# Patient Record
Sex: FEMALE | Race: ASIAN
[De-identification: names, ages, dates, MRNs, and addresses within clinical notes are randomized per-mention and may not be internally consistent; named-entity substitution may affect disease eponyms.]

---

## 2022-03-16 PROBLEM — Z00.00 ENCOUNTER FOR PREVENTIVE HEALTH EXAMINATION: Status: ACTIVE | Noted: 2022-03-16

## 2022-03-18 ENCOUNTER — NON-APPOINTMENT (OUTPATIENT)
Age: 72
End: 2022-03-18

## 2022-03-23 ENCOUNTER — APPOINTMENT (OUTPATIENT)
Dept: ORTHOPEDIC SURGERY | Facility: CLINIC | Age: 72
End: 2022-03-23
Payer: MEDICARE

## 2022-03-23 ENCOUNTER — NON-APPOINTMENT (OUTPATIENT)
Age: 72
End: 2022-03-23

## 2022-03-23 DIAGNOSIS — S99.922A UNSPECIFIED INJURY OF LEFT FOOT, INITIAL ENCOUNTER: ICD-10-CM

## 2022-03-23 DIAGNOSIS — S92.325A NONDISPLACED FRACTURE OF SECOND METATARSAL BONE, LEFT FOOT, INITIAL ENCOUNTER FOR CLOSED FRACTURE: ICD-10-CM

## 2022-03-23 DIAGNOSIS — S92.335A NONDISPLACED FRACTURE OF THIRD METATARSAL BONE, LEFT FOOT, INITIAL ENCOUNTER FOR CLOSED FRACTURE: ICD-10-CM

## 2022-03-23 PROCEDURE — 99204 OFFICE O/P NEW MOD 45 MIN: CPT

## 2022-03-23 PROCEDURE — 28470 CLTX METATARSAL FX WO MNP EA: CPT | Mod: T2,T1

## 2022-03-23 PROCEDURE — 73630 X-RAY EXAM OF FOOT: CPT | Mod: LT

## 2022-03-23 NOTE — HISTORY OF PRESENT ILLNESS
[FreeTextEntry1] : The patient is a 71 year old female presenting for an initial evaluation of left foot injury sustained on 2/16/2022. The patient reports that she fainted due to double dosing on her blood pressure medication, resulting in a fall. She then obtained x-rays confirming nondisplaced fractures to her second and third metatarsals. She was treated via CAM boot immobilization for the past 1 month. She does not currently take Vitamin D supplementation. She presents today to obtain new x-rays to assess healing of the fractures. Pain scale 3/10. She has been completing a HEP for this issue. She denies any significant pain in the office today. Denies any numbness or tingling sensations. The patient presents wearing a CAM boot. No other complaints.

## 2022-03-23 NOTE — PHYSICAL EXAM
[de-identified] : General: Alert and oriented x3. In no acute distress. Pleasant in nature with a normal affect. No apparent respiratory distress.\par \par Left Foot Exam\par Skin: Clean, dry, intact\par Inspection: No obvious malalignment, no masses, no swelling, no effusion\par Pulses: 2+ DP/PT pulses\par ROM: FOOT Full  ROM of digits, ANKLE 10 degrees of dorsiflexion, 40 degrees of plantarflexion, 10 degrees of subtalar motion.\par Painful ROM: None\par Tenderness: No pain over the second and third metatarsals. No tenderness over the medial malleolus, No tenderness over the lateral malleolus, no CFL/ATFL/PTFL pain, no deltoid ligament pain. No heel pain. No Achilles tenderness. No 5th metatarsal pain. No pain to the LisFranc joint. No ttp over the posterior tibial tendon.\par Stability: Negative anterior/posterior drawer.\par Strength: 5/5 ADD/ABD/TA/GS/EHL/FHL/EDL\par Neuro: Sensation in tact to light touch throughout\par Additional tests: Negative Mortons test, negative tarsal tunnel tinels, negative single heel rise.	 [de-identified] : 3V of the left foot were ordered, obtained, and reviewed by me today, 03/23/2022, revealed: Nondisplaced second and third metatarsal fractures. \par

## 2022-03-23 NOTE — DISCUSSION/SUMMARY
[de-identified] : Today I had a lengthy discussion with the patient regarding their left foot injury. I have addressed all the patient's concerns surrounding the pathology of their condition. XR imaging was completed in office today and results were reviewed with the patient. I recommended that the patient begin to transition out of the CAM boot to a stiff sneaker over the course of the next 2-3 weeks as tolerated. I recommend that the patient perform a home exercise program for the lower extremities. I recommend that the patient utilize 2,000 units of vitamin D supplementation. The patient understood and verbally agreed to the treatment plan. All of their questions were answered and they were satisfied with the visit. The patient should call the office if they have any questions or experience worsening symptoms. I would like to see the patient back in the office in 4-6 weeks PRN to reassess their condition. 				\par

## 2022-03-23 NOTE — ADDENDUM
[FreeTextEntry1] : I, Isabell Ochoa, acted solely as a scribe for Dr. Wallace Zafar on this date 03/23/2022.\par \par All medical record entries made by the Scribe were at my, Dr. Wallace Zafar, direction and personally dictated by me on 03/23/2022 . I have reviewed the chart and agree that the record accurately reflects my personal performance of the history, physical exam, assessment and plan. I have also personally directed, reviewed, and agreed with the chart.	\par

## 2022-12-30 NOTE — ASU PATIENT PROFILE, ADULT - FALL HARM RISK - UNIVERSAL INTERVENTIONS
Bed in lowest position, wheels locked, appropriate side rails in place/Call bell, personal items and telephone in reach/Instruct patient to call for assistance before getting out of bed or chair/Non-slip footwear when patient is out of bed/[place] to call system/Physically safe environment - no spills, clutter or unnecessary equipment/Purposeful Proactive Rounding/Room/bathroom lighting operational, light cord in reach

## 2022-12-30 NOTE — ASU PATIENT PROFILE, ADULT - MEDICATIONS TO HOLD
pt will wait to take farxiga until after procedure instructed pt will wait to take farxiga until after procedure instructed to hold xanax morning of procedure (she only takes as needed),  pt will wait to take farxiga until after procedure

## 2022-12-30 NOTE — H&P ADULT - NSICDXPASTMEDICALHX_GEN_ALL_CORE_FT
PAST MEDICAL HISTORY:  History of cardiomyopathy     HLD (hyperlipidemia)      PAST MEDICAL HISTORY:  History of cardiomyopathy     History of hepatitis B     HLD (hyperlipidemia)

## 2022-12-30 NOTE — H&P ADULT - NSICDXFAMILYHX_GEN_ALL_CORE_FT
FAMILY HISTORY:  Mother  Still living? Unknown  FH: lymphoma, Age at diagnosis: Age Unknown    Grandparent  Still living? No  Family history of heart attack, Age at diagnosis: Age Unknown

## 2022-12-30 NOTE — ASU PATIENT PROFILE, ADULT - VISION (WITH CORRECTIVE LENSES IF THE PATIENT USUALLY WEARS THEM):
reading glasses with patient/Partially impaired: cannot see medication labels or newsprint, but can see obstacles in path, and the surrounding layout; can count fingers at arm's length

## 2022-12-30 NOTE — H&P ADULT - NSHPLABSRESULTS_GEN_ALL_CORE
PET myocardial Perfusion imging (12/8/22): EF45% at stress, 39% at rest, mild global LV hypokinesis, large defect, fixed perfusion abnormality in apical anteiror, mid anterior and apical lateral segment, c/w infarction of LAD     EKG (12/8/22): SR at 73 bpm, EKG (12/8/22): SR at 73 bpm,    PET myocardial Perfusion imging (12/8/22): EF45% at stress, 39% at rest, mild global LV hypokinesis, large defect, fixed perfusion abnormality in apical anteiror, mid anterior and apical lateral segment, c/w infarction of LAD

## 2022-12-30 NOTE — H&P ADULT - HISTORY OF PRESENT ILLNESS
71. y.o female with PMHx of HLD, idiopathic cardiomyopathy presented to cardiology office for routine follow up, Pt underwent CTA coronaries, which revealed LAD infarction. Pt referred for cardiac cath for further ischemic evaluation.   COVID-19 PCR (12/31/22):     ASA class:  Cr:  GFR;  Bleedingrisk:  Juan Carlos score:  71. y.o female with PMHx of HLD, idiopathic cardiomyopathy presented to cardiology office with c/o increased fatigue. Pt underwent CTA coronaries, which revealed LAD infarction. Pt referred for cardiac cath for further ischemic evaluation.   COVID-19 PCR (12/31/22):

## 2022-12-30 NOTE — H&P ADULT - ASSESSMENT
71. y.o female with PMHx of HLD, idiopathic cardiomyopathy presented to cardiology office for routine follow up, Pt underwent CTA coronaries, which revealed LAD infarction. Pt referred for cardiac cath for further ischemic evaluation.  - ROLANDO protocol pre hydration: NS 250cc IV bolus x1   - Procedure, its risks, alternatives, benefits and potential complications were discussed in detail. Risks include but not limited to bleeding, infection, allergy, renal failure requiring dialysis, stroke, vascular injury, pericardial tamponade, arrhythmias, MI and even death. Pt is agreeable and has consented for the procedure.     71. y.o female with PMHx of HLD, idiopathic cardiomyopathy presented to cardiology office with c/o increasing fatigue, Pt underwent CTA coronaries, which revealed LAD infarction. Pt referred for cardiac cath for further ischemic evaluation.  ASA class: II  Creatinine:  0.66  GFR: 93  Bleeding  Risk score:  1.9%  Juan Carlos Score:  6 pt ( no prehydration due to decreased EF)

## 2022-12-30 NOTE — H&P ADULT - PROBLEM SELECTOR PLAN 1
with associated increased fatigue  No prehydration 2/2 decrease in EF (39%)  -Consent obtained for cardiac catheterization w/ coronary angiogram, possible sedation and possible stent placement. Pt is competent, has capacity, and understands risks and benefits of procedure. Risks and benefits discussed. Risk discussed included, but not limited to MI, stroke, mortality, major bleeding, arrythmia, or infection. All questions answered

## 2023-01-03 ENCOUNTER — OUTPATIENT (OUTPATIENT)
Dept: OUTPATIENT SERVICES | Facility: HOSPITAL | Age: 73
LOS: 1 days | Discharge: HOME | End: 2023-01-03
Payer: MEDICARE

## 2023-01-03 VITALS
TEMPERATURE: 97 F | HEIGHT: 60 IN | OXYGEN SATURATION: 98 % | SYSTOLIC BLOOD PRESSURE: 117 MMHG | HEART RATE: 76 BPM | DIASTOLIC BLOOD PRESSURE: 63 MMHG | RESPIRATION RATE: 16 BRPM | WEIGHT: 125 LBS

## 2023-01-03 VITALS
RESPIRATION RATE: 16 BRPM | DIASTOLIC BLOOD PRESSURE: 49 MMHG | HEART RATE: 82 BPM | OXYGEN SATURATION: 97 % | SYSTOLIC BLOOD PRESSURE: 91 MMHG

## 2023-01-03 DIAGNOSIS — I25.10 ATHEROSCLEROTIC HEART DISEASE OF NATIVE CORONARY ARTERY WITHOUT ANGINA PECTORIS: ICD-10-CM

## 2023-01-03 DIAGNOSIS — R53.83 OTHER FATIGUE: ICD-10-CM

## 2023-01-03 DIAGNOSIS — R93.1 ABNORMAL FINDINGS ON DIAGNOSTIC IMAGING OF HEART AND CORONARY CIRCULATION: ICD-10-CM

## 2023-01-03 DIAGNOSIS — R94.39 ABNORMAL RESULT OF OTHER CARDIOVASCULAR FUNCTION STUDY: ICD-10-CM

## 2023-01-03 PROCEDURE — 93458 L HRT ARTERY/VENTRICLE ANGIO: CPT

## 2023-01-03 PROCEDURE — 93458 L HRT ARTERY/VENTRICLE ANGIO: CPT | Mod: 26

## 2023-01-03 PROCEDURE — C1894: CPT

## 2023-01-03 PROCEDURE — C1887: CPT

## 2023-01-03 PROCEDURE — C1769: CPT

## 2023-01-03 RX ORDER — ICOSAPENT ETHYL 500 MG/1
2 CAPSULE, LIQUID FILLED ORAL
Qty: 0 | Refills: 0 | DISCHARGE

## 2023-01-03 NOTE — PROGRESS NOTE ADULT - SUBJECTIVE AND OBJECTIVE BOX
Nurse Practitioner Progress note:     HPI:  71. y.o female with PMHx of HLD, idiopathic cardiomyopathy presented to cardiology office with c/o increased fatigue. Pt underwent CTA coronaries, which revealed LAD infarction. Pt referred for cardiac cath for further ischemic evaluation.   COVID-19 PCR (12/31/22):    (30 Dec 2022 15:31)          Vital Signs  T(C): 36.3 (01-03-23 @ 09:14), Max: 36.3 (01-03-23 @ 09:14)  HR: 80 (01-03-23 @ 12:30) (71 - 84)  BP: 109/67 (01-03-23 @ 12:30) (107/64 - 117/63)  RR: 16 (01-03-23 @ 12:30) (16 - 16)  SpO2: 96% (01-03-23 @ 12:30) (95% - 98%)  Wt(kg): --        PHYSICAL EXAM:  NEURO: Non-focal, AxOx3.  No neuro deficits   CHEST/LUNG: Clear to auscultation bilaterally; No wheeze  HEART: s1 s2 Regular rate and rhythm; No murmurs, rubs, or gallops  ABDOMEN: Soft, Nontender, Nondistended; Bowel sounds present X 4 quadrants   EXTREMITIES:  2+ Peripheral Pulses, No clubbing, cyanosis, or edema   VASCULAR: Peripheral pulses palpable 2+ bilaterally  PROCEDURE SITE: RRA accessed, hemoband to be removed 1 hour post placement.  Site is without hematoma or bleeding. Sensation and OLGA intact. Distal pulses palpable 2+, capillary refill < 2 seconds. Patient denies pain, numbness, tingling, CP or SOB. Clean dry dressing applied     PROCEDURE RESULTS: < from: Cardiac Catheterization (01.03.23 @ 11:56) >   Impression     Diagnostic Conclusions     Non-obstructive coronary artery disease. EF 55% with LVEDP 12.     Recommendations     Recommend aggressive medical management of non-obstructive CAD as per   ACC/AHA guidelines. Findings relayed to patient and patient's   cardiologist.    Estimated Blood Loss:5ml.    Complications:  No complication.     Signatures     ----------------------------------------------------------------   Electronically signed by MD Marko Loza(Performing Physician)   on 01/03/2023 12:17    < end of copied text >      ASSESSMENT:   71. y.o female with PMHx of HLD, idiopathic cardiomyopathy presented to cardiology office with c/o increased fatigue. Pt underwent CTA coronaries, which revealed LAD infarction. Pt referred for cardiac cath for further ischemic evaluation.         PLAN:  -VS, diet, activity as per post cath orders  -Encourage PO fluids  -Continue current medications  -Post cath instructions reviewed, patient verbalizes and understands instructions  -Discussed therapeutic lifestyle changes to reduce risk factors such as following a cardiac diet, weight loss, maintaining a healthy weight, exercise, smoking cessation, medication compliance, and regular follow-up  with PCP/Cardioloigst  - Patient to be discharged home, recommend following up with cardiologist in 2 weeks  -Plan of care discussed with patient, RN and Dr. Loza

## 2023-01-03 NOTE — PACU DISCHARGE NOTE - COMMENTS
Patient s/p LHC via Right Radial Artery. Pressure dressing to Right Radial Artery. No s/s of bleeding or hematoma. RUE warm and mobile. VS Stable. Patient denies pain. Discharge instructions reviewed with patient and patient verbalizes understanding. SL removed. Patient pending transport to [facility] to meet her  for discharge home

## 2023-01-04 DIAGNOSIS — I21.4 NON-ST ELEVATION (NSTEMI) MYOCARDIAL INFARCTION: ICD-10-CM

## 2023-01-04 DIAGNOSIS — I25.10 ATHEROSCLEROTIC HEART DISEASE OF NATIVE CORONARY ARTERY WITHOUT ANGINA PECTORIS: ICD-10-CM

## 2023-01-24 PROBLEM — Z86.19 PERSONAL HISTORY OF OTHER INFECTIOUS AND PARASITIC DISEASES: Chronic | Status: ACTIVE | Noted: 2023-01-03

## 2023-01-24 PROBLEM — E78.5 HYPERLIPIDEMIA, UNSPECIFIED: Chronic | Status: ACTIVE | Noted: 2022-12-30

## 2023-01-24 PROBLEM — Z86.79 PERSONAL HISTORY OF OTHER DISEASES OF THE CIRCULATORY SYSTEM: Chronic | Status: ACTIVE | Noted: 2022-12-30

## 2023-02-01 ENCOUNTER — TRANSCRIPTION ENCOUNTER (OUTPATIENT)
Age: 73
End: 2023-02-01

## 2023-03-07 ENCOUNTER — NON-APPOINTMENT (OUTPATIENT)
Age: 73
End: 2023-03-07

## 2023-03-07 DIAGNOSIS — E78.5 HYPERLIPIDEMIA, UNSPECIFIED: ICD-10-CM

## 2023-03-07 DIAGNOSIS — Z78.9 OTHER SPECIFIED HEALTH STATUS: ICD-10-CM

## 2023-03-07 DIAGNOSIS — B19.10 UNSPECIFIED VIRAL HEPATITIS B W/OUT HEPATIC COMA: ICD-10-CM

## 2023-03-07 DIAGNOSIS — H26.9 UNSPECIFIED CATARACT: ICD-10-CM

## 2023-03-07 RX ORDER — ASPIRIN 81 MG/1
81 TABLET ORAL
Refills: 0 | Status: ACTIVE | COMMUNITY
Start: 2023-03-07

## 2023-03-07 RX ORDER — MULTIVIT-MIN/IRON/FOLIC ACID/K 18-600-40
500 CAPSULE ORAL
Refills: 0 | Status: ACTIVE | COMMUNITY
Start: 2023-03-07

## 2023-03-07 RX ORDER — COLD-HOT PACK
125 MCG EACH MISCELLANEOUS
Qty: 90 | Refills: 0 | Status: ACTIVE | COMMUNITY
Start: 2023-03-07

## 2023-03-07 RX ORDER — SACUBITRIL AND VALSARTAN 24; 26 MG/1; MG/1
24-26 TABLET, FILM COATED ORAL
Qty: 60 | Refills: 3 | Status: ACTIVE | COMMUNITY
Start: 2023-03-07

## 2023-03-07 RX ORDER — ALPRAZOLAM 0.25 MG/1
0.25 TABLET ORAL
Refills: 0 | Status: ACTIVE | COMMUNITY
Start: 2023-03-07

## 2023-03-07 RX ORDER — ASPIRIN 81 MG/1
81 TABLET ORAL
Refills: 0 | Status: DISCONTINUED | COMMUNITY
Start: 2023-03-07 | End: 2023-03-07

## 2023-03-07 RX ORDER — ICOSAPENT ETHYL 1000 MG/1
1 CAPSULE ORAL
Refills: 0 | Status: ACTIVE | COMMUNITY
Start: 2023-03-07

## 2023-03-07 RX ORDER — EZETIMIBE 10 MG/1
10 TABLET ORAL
Qty: 90 | Refills: 3 | Status: ACTIVE | COMMUNITY
Start: 2023-03-07

## 2023-03-07 RX ORDER — CARVEDILOL 3.12 MG/1
3.12 TABLET, FILM COATED ORAL
Qty: 180 | Refills: 2 | Status: ACTIVE | COMMUNITY
Start: 2023-03-07

## 2023-03-09 ENCOUNTER — APPOINTMENT (OUTPATIENT)
Dept: ELECTROPHYSIOLOGY | Facility: CLINIC | Age: 73
End: 2023-03-09
Payer: MEDICARE

## 2023-03-09 ENCOUNTER — NON-APPOINTMENT (OUTPATIENT)
Age: 73
End: 2023-03-09

## 2023-03-09 VITALS
HEART RATE: 76 BPM | BODY MASS INDEX: 24.94 KG/M2 | WEIGHT: 127 LBS | HEIGHT: 60 IN | OXYGEN SATURATION: 98 % | DIASTOLIC BLOOD PRESSURE: 73 MMHG | SYSTOLIC BLOOD PRESSURE: 122 MMHG

## 2023-03-09 PROCEDURE — 93000 ELECTROCARDIOGRAM COMPLETE: CPT

## 2023-03-09 PROCEDURE — 99205 OFFICE O/P NEW HI 60 MIN: CPT

## 2023-04-20 NOTE — ASSESSMENT
[FreeTextEntry1] : This is a 72-year-old woman with a nondilated cardiomyopathy.  She has been on guideline directed therapy.\par We will ask for an evaluation of her ejection fraction now that she has had clinical improvement in her symptoms.\par \par A thorough discussion was had with the patient and her family concerning all aspects of ICD/CRT-D therapy. We reviewed the data supporting ICD/CRT-D therapy and how it applies individually to this patient. We discussed the procedure, risks and outcomes of the ICD/CRT-D implantation and living with an ICD/CRT-D. We discussed management of the ICD/CRT-D throughout life, including potential to deactivate ICD therapies if goals of care change. After all questions were answered, it was a shared decision to proceed with ICD/ CRT-D therapy.\par \par QRS duration is 94 ms on today's EKG, she would qualify for an ICD but not a CRT-D if her ejection fraction remains depressed.  If EF is 35 to 40% will consider an EP study if NSVT is present. \par \par \par \par Notes from Dr. Bailey, including cath report echo stress test reviewed.\par \par

## 2023-04-20 NOTE — HISTORY OF PRESENT ILLNESS
[FreeTextEntry1] : Mrs. 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 GARO HOWARD  denies chest pain, chest pressure, shortness of breath, lightheadedness, dizziness, palpitations, syncope, presyncope, orthopnea, PND, or edema.  She has been on maximally tolerated guideline directed therapy\par

## 2023-04-20 NOTE — CARDIOLOGY SUMMARY
[de-identified] : 1/3/23 : \par \par Cardiac Arteries and Lesion Findings\par LMCA: Mild diffuse atherosclerosis\par LAD: Tortuous vessel\par  Dist LAD: 40% stenosis.Pre procedure NEERU III flow was noted.\par  Comments:Diffuse moderate atherosclerosis of mid to distal LAD\par LCx: Mild diffuse atherosclerosis\par RCA: Mild diffuse atherosclerosis\par

## 2023-05-15 ENCOUNTER — APPOINTMENT (OUTPATIENT)
Dept: ELECTROPHYSIOLOGY | Facility: CLINIC | Age: 73
End: 2023-05-15
Payer: MEDICARE

## 2023-05-15 VITALS
BODY MASS INDEX: 24.94 KG/M2 | HEIGHT: 60 IN | DIASTOLIC BLOOD PRESSURE: 46 MMHG | WEIGHT: 127 LBS | RESPIRATION RATE: 14 BRPM | SYSTOLIC BLOOD PRESSURE: 116 MMHG | HEART RATE: 84 BPM | OXYGEN SATURATION: 98 %

## 2023-05-15 PROCEDURE — 99214 OFFICE O/P EST MOD 30 MIN: CPT

## 2023-05-15 PROCEDURE — 93000 ELECTROCARDIOGRAM COMPLETE: CPT

## 2023-05-16 RX ORDER — TETRACYCLINE HYDROCHLORIDE 500 MG/1
500 CAPSULE ORAL
Qty: 40 | Refills: 0 | Status: DISCONTINUED | COMMUNITY
Start: 2023-05-03 | End: 2023-05-16

## 2023-05-16 RX ORDER — GLUCOSAMINE/MSM/CHONDROIT SULF 500-166.6
20 TABLET ORAL
Refills: 0 | Status: ACTIVE | COMMUNITY
Start: 2023-03-07

## 2023-05-16 RX ORDER — UBIDECARENONE 100 MG
100 CAPSULE ORAL
Refills: 0 | Status: ACTIVE | COMMUNITY
Start: 2023-05-16

## 2023-05-16 RX ORDER — FLUCONAZOLE 150 MG/1
150 TABLET ORAL
Qty: 2 | Refills: 0 | Status: DISCONTINUED | COMMUNITY
Start: 2023-05-03 | End: 2023-05-16

## 2023-05-16 RX ORDER — CARVEDILOL 6.25 MG/1
6.25 TABLET, FILM COATED ORAL
Qty: 180 | Refills: 0 | Status: DISCONTINUED | COMMUNITY
Start: 2022-09-12 | End: 2023-05-16

## 2023-06-02 ENCOUNTER — OUTPATIENT (OUTPATIENT)
Dept: OUTPATIENT SERVICES | Facility: HOSPITAL | Age: 73
LOS: 1 days | End: 2023-06-02
Payer: MEDICARE

## 2023-06-02 ENCOUNTER — RESULT REVIEW (OUTPATIENT)
Age: 73
End: 2023-06-02

## 2023-06-02 VITALS
HEART RATE: 72 BPM | DIASTOLIC BLOOD PRESSURE: 68 MMHG | HEIGHT: 60 IN | WEIGHT: 125.66 LBS | TEMPERATURE: 98 F | OXYGEN SATURATION: 98 % | SYSTOLIC BLOOD PRESSURE: 115 MMHG

## 2023-06-02 DIAGNOSIS — I42.9 CARDIOMYOPATHY, UNSPECIFIED: ICD-10-CM

## 2023-06-02 DIAGNOSIS — Z98.890 OTHER SPECIFIED POSTPROCEDURAL STATES: Chronic | ICD-10-CM

## 2023-06-02 DIAGNOSIS — Z01.818 ENCOUNTER FOR OTHER PREPROCEDURAL EXAMINATION: ICD-10-CM

## 2023-06-02 LAB
ABO RH CONFIRMATION: SIGNIFICANT CHANGE UP
ANION GAP SERPL CALC-SCNC: 1 MMOL/L — LOW (ref 5–17)
APTT BLD: 32.6 SEC — SIGNIFICANT CHANGE UP (ref 27.5–35.5)
BASOPHILS # BLD AUTO: 0.02 K/UL — SIGNIFICANT CHANGE UP (ref 0–0.2)
BASOPHILS NFR BLD AUTO: 0.4 % — SIGNIFICANT CHANGE UP (ref 0–2)
BLD GP AB SCN SERPL QL: SIGNIFICANT CHANGE UP
BUN SERPL-MCNC: 16 MG/DL — SIGNIFICANT CHANGE UP (ref 7–23)
CALCIUM SERPL-MCNC: 9.2 MG/DL — SIGNIFICANT CHANGE UP (ref 8.5–10.1)
CHLORIDE SERPL-SCNC: 110 MMOL/L — HIGH (ref 96–108)
CO2 SERPL-SCNC: 28 MMOL/L — SIGNIFICANT CHANGE UP (ref 22–31)
CREAT SERPL-MCNC: 0.77 MG/DL — SIGNIFICANT CHANGE UP (ref 0.5–1.3)
EGFR: 82 ML/MIN/1.73M2 — SIGNIFICANT CHANGE UP
EOSINOPHIL # BLD AUTO: 0.1 K/UL — SIGNIFICANT CHANGE UP (ref 0–0.5)
EOSINOPHIL NFR BLD AUTO: 2 % — SIGNIFICANT CHANGE UP (ref 0–6)
GLUCOSE SERPL-MCNC: 103 MG/DL — HIGH (ref 70–99)
HCT VFR BLD CALC: 41.6 % — SIGNIFICANT CHANGE UP (ref 34.5–45)
HGB BLD-MCNC: 13.7 G/DL — SIGNIFICANT CHANGE UP (ref 11.5–15.5)
IMM GRANULOCYTES NFR BLD AUTO: 0.4 % — SIGNIFICANT CHANGE UP (ref 0–0.9)
INR BLD: 1.06 RATIO — SIGNIFICANT CHANGE UP (ref 0.88–1.16)
LYMPHOCYTES # BLD AUTO: 1.32 K/UL — SIGNIFICANT CHANGE UP (ref 1–3.3)
LYMPHOCYTES # BLD AUTO: 25.8 % — SIGNIFICANT CHANGE UP (ref 13–44)
MCHC RBC-ENTMCNC: 32.4 PG — SIGNIFICANT CHANGE UP (ref 27–34)
MCHC RBC-ENTMCNC: 32.9 GM/DL — SIGNIFICANT CHANGE UP (ref 32–36)
MCV RBC AUTO: 98.3 FL — SIGNIFICANT CHANGE UP (ref 80–100)
MONOCYTES # BLD AUTO: 0.59 K/UL — SIGNIFICANT CHANGE UP (ref 0–0.9)
MONOCYTES NFR BLD AUTO: 11.5 % — SIGNIFICANT CHANGE UP (ref 2–14)
NEUTROPHILS # BLD AUTO: 3.07 K/UL — SIGNIFICANT CHANGE UP (ref 1.8–7.4)
NEUTROPHILS NFR BLD AUTO: 59.9 % — SIGNIFICANT CHANGE UP (ref 43–77)
PLATELET # BLD AUTO: 189 K/UL — SIGNIFICANT CHANGE UP (ref 150–400)
POTASSIUM SERPL-MCNC: 4.4 MMOL/L — SIGNIFICANT CHANGE UP (ref 3.5–5.3)
POTASSIUM SERPL-SCNC: 4.4 MMOL/L — SIGNIFICANT CHANGE UP (ref 3.5–5.3)
PROTHROM AB SERPL-ACNC: 12.3 SEC — SIGNIFICANT CHANGE UP (ref 10.5–13.4)
RBC # BLD: 4.23 M/UL — SIGNIFICANT CHANGE UP (ref 3.8–5.2)
RBC # FLD: 12.4 % — SIGNIFICANT CHANGE UP (ref 10.3–14.5)
SODIUM SERPL-SCNC: 139 MMOL/L — SIGNIFICANT CHANGE UP (ref 135–145)
WBC # BLD: 5.12 K/UL — SIGNIFICANT CHANGE UP (ref 3.8–10.5)
WBC # FLD AUTO: 5.12 K/UL — SIGNIFICANT CHANGE UP (ref 3.8–10.5)

## 2023-06-02 PROCEDURE — 36415 COLL VENOUS BLD VENIPUNCTURE: CPT

## 2023-06-02 PROCEDURE — 86900 BLOOD TYPING SEROLOGIC ABO: CPT

## 2023-06-02 PROCEDURE — 71046 X-RAY EXAM CHEST 2 VIEWS: CPT

## 2023-06-02 PROCEDURE — 93010 ELECTROCARDIOGRAM REPORT: CPT

## 2023-06-02 PROCEDURE — 87641 MR-STAPH DNA AMP PROBE: CPT

## 2023-06-02 PROCEDURE — 71046 X-RAY EXAM CHEST 2 VIEWS: CPT | Mod: 26

## 2023-06-02 PROCEDURE — 85730 THROMBOPLASTIN TIME PARTIAL: CPT

## 2023-06-02 PROCEDURE — 85025 COMPLETE CBC W/AUTO DIFF WBC: CPT

## 2023-06-02 PROCEDURE — 87640 STAPH A DNA AMP PROBE: CPT

## 2023-06-02 PROCEDURE — 80048 BASIC METABOLIC PNL TOTAL CA: CPT

## 2023-06-02 PROCEDURE — 86901 BLOOD TYPING SEROLOGIC RH(D): CPT

## 2023-06-02 PROCEDURE — 93005 ELECTROCARDIOGRAM TRACING: CPT

## 2023-06-02 PROCEDURE — 86850 RBC ANTIBODY SCREEN: CPT

## 2023-06-02 PROCEDURE — 85610 PROTHROMBIN TIME: CPT

## 2023-06-02 RX ORDER — SACUBITRIL AND VALSARTAN 24; 26 MG/1; MG/1
1 TABLET, FILM COATED ORAL
Qty: 0 | Refills: 0 | DISCHARGE

## 2023-06-02 NOTE — H&P PST ADULT - NSICDXPASTMEDICALHX_GEN_ALL_CORE_FT
PAST MEDICAL HISTORY:  History of cardiomyopathy     History of hepatitis B     HLD (hyperlipidemia)      PAST MEDICAL HISTORY:  CAD (coronary artery disease)     Foot fracture, left     History of cardiomyopathy     History of hepatitis B     HLD (hyperlipidemia)

## 2023-06-02 NOTE — H&P PST ADULT - NSANTHOSAYNRD_GEN_A_CORE
No. JLUIS screening performed.  STOP BANG Legend: 0-2 = LOW Risk; 3-4 = INTERMEDIATE Risk; 5-8 = HIGH Risk

## 2023-06-02 NOTE — H&P PST ADULT - NSICDXFAMILYHX_GEN_ALL_CORE_FT
FAMILY HISTORY:  Mother  Still living? Unknown  FH: lymphoma, Age at diagnosis: Age Unknown    Sibling  Still living? Yes, Estimated age: 61-70  FHx: breast cancer, Age at diagnosis: Age Unknown    Grandparent  Still living? No  Family history of heart attack, Age at diagnosis: Age Unknown

## 2023-06-02 NOTE — H&P PST ADULT - ASSESSMENT
72 year old female with PMH of cadiomyopathy, diagnosed about 10 years ago, [facility] most recent EF 37%. Recent outpatient cardiac monitoring x 10 days revealed episodes of HR >150BPM.  Patient notes feeling random tiredness and shortness of breath with walking, occasional light headedness, notes recent single episode of palpitations during the night which woke her up with some associated shortness of breath, denies chest pain,.  Presents to PST for planned EP study/Possible ICD implant with Dr. Levin on June 7, 2023.    1. EPS department to give written instructions to patient regarding medication management .  Patient aware to HOLD Farxiga for 3 days(72hours) prior to the procedure.   2. EPS booking will call patient the day before procedure regarding patient arrival time the day of surgery.  3. Instructed patient to have responsible adult to drive patient home if being discharged same day.  6. Pt denies symtopms of Covid cought fever, loss of taste or smell   7. EZ wash and mupirocin instructions explained to patient.  72 year old female with PMH of cadiomyopathy, diagnosed about 10 years ago, states most recent EF 37%. Recent outpatient cardiac monitoring x 10 days revealed episodes of HR >150BPM.  Patient notes feeling random tiredness and shortness of breath with walking, occasional light headedness, notes recent single episode of palpitations during the night which woke her up with some associated shortness of breath, denies chest pain,.  Presents to PST for planned EP study/Possible ICD implant with Dr. Levin on June 7, 2023.    1. EPS department to give written instructions to patient regarding medication management .  Patient aware to HOLD Farxiga for 3 days(72hours) prior to the procedure.   2. EPS booking will call patient the day before procedure regarding patient arrival time the day of surgery.  3. Instructed patient to have responsible adult to drive patient home if being discharged same day.  6. Pt denies symtopms of Covid cough fever, loss of taste or smell   7. EZ wash and mupirocin instructions explained to patient.

## 2023-06-02 NOTE — H&P PST ADULT - HISTORY OF PRESENT ILLNESS
72 year old female with PMH of cadiomyopathy diagnosed about 10 years ago, states current EF 37%. Recent outpatient cardiac monitoring x 10 days revealed episodes of tachychardia.  Patient notes feeling random tiredness and shortness of breath with walking, occasional light headedness, denies chest pain, notes recent single episode of palpitations during the night which woke her up with some associated hortness of breath.   72 year old female with PMH of cadiomyopathy, diagnosed about 10 years ago, [facility] most recent EF 37%. Recent outpatient cardiac monitoring x 10 days revealed episodes of HR >150BPM.  Patient notes feeling random tiredness and shortness of breath with walking, occasional light headedness, notes recent single episode of palpitations during the night which woke her up with some associated shortness of breath, denies chest pain,.  Presents to PST for planned EP study/Possible ICD implant with Dr. Levin on June 7, 2023.

## 2023-06-02 NOTE — H&P PST ADULT - MUSCULOSKELETAL
details… some lower back stiffness when getting up from seated position/strength 5/5 bilateral upper extremities/strength 5/5 bilateral lower extremities

## 2023-06-03 DIAGNOSIS — Z01.818 ENCOUNTER FOR OTHER PREPROCEDURAL EXAMINATION: ICD-10-CM

## 2023-06-03 DIAGNOSIS — I42.9 CARDIOMYOPATHY, UNSPECIFIED: ICD-10-CM

## 2023-06-03 LAB
MRSA PCR RESULT.: SIGNIFICANT CHANGE UP
S AUREUS DNA NOSE QL NAA+PROBE: SIGNIFICANT CHANGE UP

## 2023-06-07 ENCOUNTER — OUTPATIENT (OUTPATIENT)
Dept: OUTPATIENT SERVICES | Facility: HOSPITAL | Age: 73
LOS: 1 days | Discharge: HOME | End: 2023-06-07
Payer: MEDICARE

## 2023-06-07 VITALS
OXYGEN SATURATION: 100 % | DIASTOLIC BLOOD PRESSURE: 66 MMHG | RESPIRATION RATE: 17 BRPM | HEART RATE: 94 BPM | SYSTOLIC BLOOD PRESSURE: 97 MMHG

## 2023-06-07 VITALS
DIASTOLIC BLOOD PRESSURE: 61 MMHG | HEART RATE: 70 BPM | WEIGHT: 151.9 LBS | SYSTOLIC BLOOD PRESSURE: 114 MMHG | HEIGHT: 60 IN | TEMPERATURE: 98 F | RESPIRATION RATE: 20 BRPM | OXYGEN SATURATION: 100 %

## 2023-06-07 DIAGNOSIS — I47.1 SUPRAVENTRICULAR TACHYCARDIA: ICD-10-CM

## 2023-06-07 DIAGNOSIS — Z98.890 OTHER SPECIFIED POSTPROCEDURAL STATES: Chronic | ICD-10-CM

## 2023-06-07 DIAGNOSIS — I42.9 CARDIOMYOPATHY, UNSPECIFIED: ICD-10-CM

## 2023-06-07 PROBLEM — S92.902A UNSPECIFIED FRACTURE OF LEFT FOOT, INITIAL ENCOUNTER FOR CLOSED FRACTURE: Chronic | Status: ACTIVE | Noted: 2023-06-02

## 2023-06-07 PROBLEM — I25.10 ATHEROSCLEROTIC HEART DISEASE OF NATIVE CORONARY ARTERY WITHOUT ANGINA PECTORIS: Chronic | Status: ACTIVE | Noted: 2023-06-02

## 2023-06-07 PROCEDURE — 93620 COMP EP EVL R AT VEN PAC&REC: CPT | Mod: 26

## 2023-06-07 PROCEDURE — C1730: CPT

## 2023-06-07 PROCEDURE — 93623 PRGRMD STIMJ&PACG IV RX NFS: CPT | Mod: 26

## 2023-06-07 PROCEDURE — C1894: CPT

## 2023-06-07 PROCEDURE — 93623 PRGRMD STIMJ&PACG IV RX NFS: CPT

## 2023-06-07 PROCEDURE — 93620 COMP EP EVL R AT VEN PAC&REC: CPT

## 2023-06-07 RX ORDER — ENTECAVIR 0.5 MG/1
1 TABLET ORAL
Qty: 0 | Refills: 0 | DISCHARGE

## 2023-06-07 RX ORDER — CEFAZOLIN SODIUM 1 G
2000 VIAL (EA) INJECTION ONCE
Refills: 0 | Status: DISCONTINUED | OUTPATIENT
Start: 2023-06-07 | End: 2023-06-07

## 2023-06-07 RX ORDER — ASPIRIN/CALCIUM CARB/MAGNESIUM 324 MG
81 TABLET ORAL
Qty: 0 | Refills: 0 | DISCHARGE

## 2023-06-07 RX ORDER — CARVEDILOL PHOSPHATE 80 MG/1
1 CAPSULE, EXTENDED RELEASE ORAL
Qty: 0 | Refills: 0 | DISCHARGE

## 2023-06-07 RX ORDER — LUTEIN 20 MG
1 CAPSULE ORAL
Refills: 0 | DISCHARGE

## 2023-06-07 RX ORDER — ALPRAZOLAM 0.25 MG
0.25 TABLET ORAL
Qty: 0 | Refills: 0 | DISCHARGE

## 2023-06-07 RX ORDER — METHYLPREDNISOLONE 4 MG
1 TABLET ORAL
Refills: 0 | DISCHARGE

## 2023-06-07 RX ORDER — DAPAGLIFLOZIN 10 MG/1
1 TABLET, FILM COATED ORAL
Qty: 0 | Refills: 0 | DISCHARGE

## 2023-06-07 RX ORDER — ROSUVASTATIN CALCIUM 5 MG/1
1 TABLET ORAL
Qty: 0 | Refills: 0 | DISCHARGE

## 2023-06-07 RX ORDER — SACUBITRIL AND VALSARTAN 24; 26 MG/1; MG/1
0.5 TABLET, FILM COATED ORAL
Refills: 0 | DISCHARGE

## 2023-06-07 RX ORDER — ASCORBIC ACID 60 MG
0 TABLET,CHEWABLE ORAL
Qty: 0 | Refills: 0 | DISCHARGE

## 2023-06-07 RX ORDER — CHOLECALCIFEROL (VITAMIN D3) 125 MCG
1 CAPSULE ORAL
Refills: 0 | DISCHARGE

## 2023-06-07 RX ORDER — EZETIMIBE 10 MG/1
1 TABLET ORAL
Qty: 0 | Refills: 0 | DISCHARGE

## 2023-06-07 RX ORDER — ICOSAPENT ETHYL 500 MG/1
1 CAPSULE, LIQUID FILLED ORAL
Refills: 0 | DISCHARGE

## 2023-06-07 RX ORDER — UBIDECARENONE 100 MG
1 CAPSULE ORAL
Refills: 0 | DISCHARGE

## 2023-06-07 RX ORDER — ASCORBIC ACID 60 MG
1 TABLET,CHEWABLE ORAL
Refills: 0 | DISCHARGE

## 2023-06-07 NOTE — PACU DISCHARGE NOTE - COMMENTS
Discharge instructions given and all questions answered.  Right groin dressing c/d/i and no s.s bleeding noted.  Site soft to palpation, pt ambulating without difficulty and no bleeding after ambulation.  VSS, safety maintained

## 2023-07-05 ENCOUNTER — NON-APPOINTMENT (OUTPATIENT)
Age: 73
End: 2023-07-05

## 2023-07-05 NOTE — CARDIOLOGY SUMMARY
[de-identified] : 5/2/23 : Zio patch  1 6 beat run NSVT.  [de-identified] : 3/15/23 : 37% EF [de-identified] : 1/3/23 : \par \par Cardiac Arteries and Lesion Findings\par LMCA: Mild diffuse atherosclerosis\par LAD: Tortuous vessel\par  Dist LAD: 40% stenosis.Pre procedure NEERU III flow was noted.\par  Comments:Diffuse moderate atherosclerosis of mid to distal LAD\par LCx: Mild diffuse atherosclerosis\par RCA: Mild diffuse atherosclerosis\par

## 2023-07-05 NOTE — HISTORY OF PRESENT ILLNESS
[FreeTextEntry1] : Mrs. 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 GARO HOWARD  denies chest pain, chest pressure, shortness of breath, lightheadedness, dizziness, palpitations, syncope, presyncope, orthopnea, PND, or edema.  She has been on maximally tolerated guideline directed therapy She underwent a MUGA with an EF of 37% . \par

## 2023-07-05 NOTE — ASSESSMENT
[FreeTextEntry1] : This is a 72-year-old woman with a nondilated cardiomyopathy.  She has been on guideline directed therapy.\par We will ask for an evaluation of her ejection fraction now that she has had clinical improvement in her symptoms.\par \par  EF is now  40% and  NSVT is present on Zio patch . Will recommend an EP study for risk stratification based on MUSTT criteria. If inducible VT is present will qualify for ICD. \par \par \par \par Notes from Dr. Bailey, including cath report, Holter monitor reviewed. \par

## 2023-07-14 ENCOUNTER — NON-APPOINTMENT (OUTPATIENT)
Age: 73
End: 2023-07-14

## 2023-07-14 ENCOUNTER — APPOINTMENT (OUTPATIENT)
Dept: ELECTROPHYSIOLOGY | Facility: CLINIC | Age: 73
End: 2023-07-14
Payer: MEDICARE

## 2023-07-14 VITALS
SYSTOLIC BLOOD PRESSURE: 105 MMHG | HEIGHT: 60 IN | RESPIRATION RATE: 14 BRPM | DIASTOLIC BLOOD PRESSURE: 55 MMHG | HEART RATE: 76 BPM | BODY MASS INDEX: 24.94 KG/M2 | OXYGEN SATURATION: 97 % | WEIGHT: 127 LBS

## 2023-07-14 DIAGNOSIS — I42.9 CARDIOMYOPATHY, UNSPECIFIED: ICD-10-CM

## 2023-07-14 DIAGNOSIS — I47.29 OTHER VENTRICULAR TACHYCARDIA: ICD-10-CM

## 2023-07-14 PROCEDURE — 93000 ELECTROCARDIOGRAM COMPLETE: CPT

## 2023-07-14 PROCEDURE — 99213 OFFICE O/P EST LOW 20 MIN: CPT

## 2023-07-14 RX ORDER — ENTECAVIR 0.5 MG/1
0.5 TABLET, FILM COATED ORAL DAILY
Refills: 0 | Status: DISCONTINUED | COMMUNITY
Start: 2023-03-07 | End: 2023-07-14

## 2023-07-14 RX ORDER — ROSUVASTATIN CALCIUM 5 MG/1
5 TABLET, FILM COATED ORAL
Qty: 90 | Refills: 1 | Status: DISCONTINUED | COMMUNITY
Start: 2023-03-07 | End: 2023-07-14

## 2023-07-14 RX ORDER — DAPAGLIFLOZIN 10 MG/1
10 TABLET, FILM COATED ORAL DAILY
Qty: 90 | Refills: 2 | Status: DISCONTINUED | COMMUNITY
Start: 2023-03-07 | End: 2023-07-14

## 2023-07-14 NOTE — CARDIOLOGY SUMMARY
[de-identified] : 5/2/23 : Zio patch  1 6 beat run NSVT.  [de-identified] : 3/15/23 : 37% EF [de-identified] : 1/3/23 : \par \par Cardiac Arteries and Lesion Findings\par LMCA: Mild diffuse atherosclerosis\par LAD: Tortuous vessel\par  Dist LAD: 40% stenosis.Pre procedure NEERU III flow was noted.\par  Comments:Diffuse moderate atherosclerosis of mid to distal LAD\par LCx: Mild diffuse atherosclerosis\par RCA: Mild diffuse atherosclerosis\par

## 2023-07-14 NOTE — HISTORY OF PRESENT ILLNESS
[FreeTextEntry1] : Mrs. 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 GARO HOWARD  denies chest pain, chest pressure, shortness of breath, lightheadedness, dizziness, palpitations, syncope, presyncope, orthopnea, PND, or edema.  She has been on maximally tolerated guideline directed therapy She underwent a MUGA with an EF of 37% .  EP Study noninducible.  No complications after procedure. \par

## 2023-07-14 NOTE — ASSESSMENT
[FreeTextEntry1] : This is a 72-year-old woman with a nondilated cardiomyopathy.  She has been on guideline directed therapy.\par We will ask for an evaluation of her ejection fraction now that she has had clinical improvement in her symptoms.\par \par  EF is now   37% and EP Study is negative. \par Recommend GDMT as per Dr. Bailey.

## 2024-03-14 ENCOUNTER — OFFICE (OUTPATIENT)
Dept: URBAN - METROPOLITAN AREA CLINIC 12 | Facility: CLINIC | Age: 74
Setting detail: OPHTHALMOLOGY
End: 2024-03-14
Payer: MEDICARE

## 2024-03-14 VITALS — HEIGHT: 55 IN

## 2024-03-14 DIAGNOSIS — H35.40: ICD-10-CM

## 2024-03-14 DIAGNOSIS — H40.013: ICD-10-CM

## 2024-03-14 DIAGNOSIS — H35.3131: ICD-10-CM

## 2024-03-14 DIAGNOSIS — H53.002: ICD-10-CM

## 2024-03-14 DIAGNOSIS — H43.813: ICD-10-CM

## 2024-03-14 DIAGNOSIS — H16.223: ICD-10-CM

## 2024-03-14 DIAGNOSIS — H52.4: ICD-10-CM

## 2024-03-14 DIAGNOSIS — H44.22: ICD-10-CM

## 2024-03-14 DIAGNOSIS — Z96.1: ICD-10-CM

## 2024-03-14 PROCEDURE — 92014 COMPRE OPH EXAM EST PT 1/>: CPT | Performed by: OPHTHALMOLOGY

## 2024-03-14 PROCEDURE — 92015 DETERMINE REFRACTIVE STATE: CPT | Performed by: OPHTHALMOLOGY

## 2024-03-14 PROCEDURE — 92083 EXTENDED VISUAL FIELD XM: CPT | Performed by: OPHTHALMOLOGY

## 2024-03-14 PROCEDURE — 92133 CPTRZD OPH DX IMG PST SGM ON: CPT | Performed by: OPHTHALMOLOGY

## 2024-03-14 PROCEDURE — 76514 ECHO EXAM OF EYE THICKNESS: CPT | Performed by: OPHTHALMOLOGY

## 2024-03-14 ASSESSMENT — REFRACTION_CURRENTRX
OD_ADD: +2.25
OS_CYLINDER: -0.50
OD_VPRISM_DIRECTION: BF
OD_CYLINDER: -0.25
OS_AXIS: 049
OS_OVR_VA: 20/
OS_VPRISM_DIRECTION: BF
OD_AXIS: 095
OS_SPHERE: -0.75
OS_ADD: +2.25
OD_OVR_VA: 20/
OD_SPHERE: -0.50

## 2024-03-14 ASSESSMENT — REFRACTION_MANIFEST
OD_SPHERE: -0.50
OS_SPHERE: -0.25
OD_AXIS: 95
OS_ADD: +2.25
OD_VA1: 20/20-2
OD_ADD: +2.25
OD_CYLINDER: -0.25
OS_CYLINDER: -0.25
OS_CYLINDER: -0.25
OS_AXIS: 50
OD_AXIS: 090
OD_CYLINDER: -0.25
OD_SPHERE: -0.50
OS_SPHERE: -0.75
OD_ADD: +2.25
OS_AXIS: 023
OD_VA1: 20/25
OS_ADD: +2.25

## 2024-03-14 ASSESSMENT — SPHEQUIV_DERIVED
OS_SPHEQUIV: -0.375
OD_SPHEQUIV: -0.625
OS_SPHEQUIV: -0.875
OD_SPHEQUIV: -0.625

## 2024-06-19 NOTE — ASU PATIENT PROFILE, ADULT - HOW PATIENT ADDRESSED, PROFILE
[facility] ED/OBS Care Transition episode closed due to completion of 7-day program. Patient referred to ACM services due to high risk for admission/ED visit.      Gloria Gonzalez RN  Care Transition Nurse  [facility] [place][note 3]  [phone]   anmol

## 2024-08-05 PROBLEM — H50.011 BLEPHARITIS ; BOTH EYES: Status: ACTIVE | Noted: 2024-08-05

## 2024-08-05 PROBLEM — H50.012 BLEPHARITIS ; BOTH EYES: Status: ACTIVE | Noted: 2024-08-05

## 2025-01-28 ENCOUNTER — NON-APPOINTMENT (OUTPATIENT)
Age: 75
End: 2025-01-28

## 2025-01-30 ENCOUNTER — NON-APPOINTMENT (OUTPATIENT)
Age: 75
End: 2025-01-30

## 2025-01-30 ENCOUNTER — APPOINTMENT (OUTPATIENT)
Dept: OPHTHALMOLOGY | Facility: CLINIC | Age: 75
End: 2025-01-30
Payer: MEDICARE

## 2025-01-30 PROCEDURE — 92004 COMPRE OPH EXAM NEW PT 1/>: CPT

## 2025-06-23 PROBLEM — E11.3311 DM TYPE 2; RIGHT MOD WITH ME, LEFT MOD WITHOUT ME; 
BOTH EYES: Status: ACTIVE | Noted: 2025-06-23

## 2025-06-23 PROBLEM — E11.3392 DM TYPE 2; RIGHT MOD WITH ME, LEFT MOD WITHOUT ME; 
BOTH EYES: Status: ACTIVE | Noted: 2025-06-23